# Patient Record
Sex: FEMALE | Race: WHITE | Employment: FULL TIME | URBAN - METROPOLITAN AREA
[De-identification: names, ages, dates, MRNs, and addresses within clinical notes are randomized per-mention and may not be internally consistent; named-entity substitution may affect disease eponyms.]

---

## 2017-01-26 RX ORDER — QUINAPRIL HCL AND HYDROCHLOROTHIAZIDE 10; 12.5 MG/1; MG/1
TABLET ORAL
Qty: 90 TABLET | Refills: 0 | OUTPATIENT
Start: 2017-01-26

## 2017-06-07 ENCOUNTER — OFFICE VISIT (OUTPATIENT)
Dept: INTERNAL MEDICINE CLINIC | Facility: CLINIC | Age: 62
End: 2017-06-07

## 2017-06-07 ENCOUNTER — TELEPHONE (OUTPATIENT)
Dept: INTERNAL MEDICINE CLINIC | Facility: CLINIC | Age: 62
End: 2017-06-07

## 2017-06-07 DIAGNOSIS — H81.10 BPPV (BENIGN PAROXYSMAL POSITIONAL VERTIGO), UNSPECIFIED LATERALITY: Primary | ICD-10-CM

## 2017-06-07 PROCEDURE — 99213 OFFICE O/P EST LOW 20 MIN: CPT | Performed by: PHYSICIAN ASSISTANT

## 2017-06-07 RX ORDER — PREDNISONE 20 MG/1
20 TABLET ORAL DAILY
Qty: 7 TABLET | Refills: 0 | Status: SHIPPED | OUTPATIENT
Start: 2017-06-07 | End: 2017-06-14

## 2017-06-07 RX ORDER — MECLIZINE HYDROCHLORIDE 25 MG/1
25 TABLET ORAL 3 TIMES DAILY PRN
Qty: 21 TABLET | Refills: 0 | Status: SHIPPED | OUTPATIENT
Start: 2017-06-07 | End: 2018-04-09 | Stop reason: ALTCHOICE

## 2017-06-07 NOTE — TELEPHONE ENCOUNTER
Spoke with patient and she c/o waking up with a lot of dizziness that gets worse when she stands and walks. She denies nausea, vomiting, ear pain, fevers, chest pain, SOB or HA.    Patient took a Sudafed this morning and she states her symptoms improved sli

## 2017-06-07 NOTE — TELEPHONE ENCOUNTER
Pt left a message saying she has been dizzy since the morning, and has to stay laying down - she has been drinking a lot of water but the dizziness is getting worse. Please call her to advise.

## 2017-06-07 NOTE — TELEPHONE ENCOUNTER
Patient currently takes antihypertensives, she was advised to take her BP and call back with reading.

## 2017-06-07 NOTE — TELEPHONE ENCOUNTER
Wandadon Estrada recommends coming in for evaluation of symptoms, due to all her symptoms being new and sudden. If she can not here, she needs to proceed to the ED. Spoke with patient and relayed instructions.  She verbalized understanding and states she will try

## 2017-06-09 VITALS
DIASTOLIC BLOOD PRESSURE: 80 MMHG | HEART RATE: 79 BPM | SYSTOLIC BLOOD PRESSURE: 130 MMHG | TEMPERATURE: 98 F | OXYGEN SATURATION: 98 % | RESPIRATION RATE: 16 BRPM

## 2017-06-09 NOTE — PROGRESS NOTES
HPI:    Patient ID: Jamaal Ortega is a 58year old female. Dizziness  This is a new problem. The current episode started today. The problem occurs constantly. The problem has been gradually improving. Associated symptoms include nausea and vertigo.  Pert aspirin 81 MG Oral Tab Take 81 mg by mouth daily. Disp:  Rfl:    [DISCONTINUED] Rosuvastatin Calcium (CRESTOR) 10 MG Oral Tab Take 1 tablet (10 mg total) by mouth daily.  Disp: 90 tablet Rfl: 1     Allergies:No Known Allergies   PHYSICAL EXAM:   Physical this encounter. Meds This Visit:  Signed Prescriptions Disp Refills    predniSONE 20 MG Oral Tab 7 tablet 0      Sig: Take 1 tablet (20 mg total) by mouth daily.       Meclizine HCl 25 MG Oral Tab 21 tablet 0      Sig: Take 1 tablet (25 mg total) by m

## 2017-06-18 DIAGNOSIS — E78.5 HYPERLIPIDEMIA WITH TARGET LDL LESS THAN 130: Primary | ICD-10-CM

## 2017-06-19 RX ORDER — ROSUVASTATIN CALCIUM 10 MG/1
TABLET, COATED ORAL
Qty: 90 TABLET | Refills: 1 | Status: SHIPPED | OUTPATIENT
Start: 2017-06-19 | End: 2018-04-09

## 2017-07-10 ENCOUNTER — CHARTING TRANS (OUTPATIENT)
Dept: OTHER | Age: 62
End: 2017-07-10

## 2017-07-12 ENCOUNTER — TELEPHONE (OUTPATIENT)
Dept: INTERNAL MEDICINE CLINIC | Facility: CLINIC | Age: 62
End: 2017-07-12

## 2017-07-12 DIAGNOSIS — J32.9 SINUSITIS, UNSPECIFIED CHRONICITY, UNSPECIFIED LOCATION: Primary | ICD-10-CM

## 2017-07-12 RX ORDER — AZITHROMYCIN 250 MG/1
TABLET, FILM COATED ORAL
Qty: 6 TABLET | Refills: 0 | Status: SHIPPED | OUTPATIENT
Start: 2017-07-12 | End: 2017-07-20 | Stop reason: ALTCHOICE

## 2017-07-12 NOTE — TELEPHONE ENCOUNTER
Spoke with patient and she c/o sore throat, hoarseness, nasal congestion for a couple of days. She denies fevers, nausea or vomiting. Patient requesting Levi Hernandez be sent to her pharmacy. States she does not have a way to get here today to be seen.          Paige Hussein

## 2017-07-12 NOTE — TELEPHONE ENCOUNTER
Pt has had a sore throat/ear ache and congestion since yesterday - she wants to know if we could call in a medication for her? Her daughter is due with a baby soon so she wants to get over this sickness as soon as she can. Please c/b at [phone].

## 2017-07-18 RX ORDER — QUINAPRIL HCL AND HYDROCHLOROTHIAZIDE 10; 12.5 MG/1; MG/1
TABLET ORAL
Qty: 90 TABLET | Refills: 0 | Status: SHIPPED | OUTPATIENT
Start: 2017-07-18 | End: 2017-10-15

## 2017-07-20 ENCOUNTER — TELEPHONE (OUTPATIENT)
Dept: INTERNAL MEDICINE CLINIC | Facility: CLINIC | Age: 62
End: 2017-07-20

## 2017-07-20 ENCOUNTER — OFFICE VISIT (OUTPATIENT)
Dept: INTERNAL MEDICINE CLINIC | Facility: CLINIC | Age: 62
End: 2017-07-20

## 2017-07-20 VITALS
WEIGHT: 178 LBS | DIASTOLIC BLOOD PRESSURE: 82 MMHG | HEART RATE: 64 BPM | OXYGEN SATURATION: 99 % | RESPIRATION RATE: 16 BRPM | TEMPERATURE: 99 F | BODY MASS INDEX: 28.61 KG/M2 | HEIGHT: 66 IN | SYSTOLIC BLOOD PRESSURE: 128 MMHG

## 2017-07-20 DIAGNOSIS — R05.8 UPPER AIRWAY COUGH SYNDROME: Primary | ICD-10-CM

## 2017-07-20 PROCEDURE — 99213 OFFICE O/P EST LOW 20 MIN: CPT | Performed by: STUDENT IN AN ORGANIZED HEALTH CARE EDUCATION/TRAINING PROGRAM

## 2017-07-20 RX ORDER — CODEINE PHOSPHATE AND GUAIFENESIN 10; 100 MG/5ML; MG/5ML
10 SOLUTION ORAL NIGHTLY
Qty: 180 ML | Refills: 0 | Status: SHIPPED | OUTPATIENT
Start: 2017-07-20 | End: 2018-04-09 | Stop reason: ALTCHOICE

## 2017-07-20 RX ORDER — FLUTICASONE PROPIONATE 50 MCG
1 SPRAY, SUSPENSION (ML) NASAL DAILY
Qty: 1 BOTTLE | Refills: 0 | Status: SHIPPED | OUTPATIENT
Start: 2017-07-20 | End: 2017-07-27

## 2017-07-20 NOTE — TELEPHONE ENCOUNTER
Patient c/o nasal congestion and cough, denies fevers, N/V. She states nasal discharge is clear. She is requesting to be seen as soon as possible before noon today. States she completed Zpak given by Adrian Martinez.  Denies taking any antihistamines or anything el

## 2017-07-20 NOTE — PROGRESS NOTES
HPI:    Patient ID: Carmela Bills is a 58year old female. Cough   This is a new problem. Episode onset: for the past 10 days. The problem has been unchanged. The problem occurs hourly. The cough is non-productive.  Associated symptoms include nasal jonn Tab Take 81 mg by mouth daily. Disp:  Rfl:      Allergies:No Known Allergies   PHYSICAL EXAM:   Physical Exam   Constitutional: She is oriented to person, place, and time. She appears well-developed and well-nourished.    HENT:   Head: Normocephalic and atr

## 2017-07-20 NOTE — TELEPHONE ENCOUNTER
Pt was prescribed an antibiotic for her cold symptoms, but she has been taking the medication for 8 days and is still congested/doesn't feel well. She is concerned bc her daughter is due with a baby any day and she wants to be healthy.  Please let her know

## 2017-10-15 DIAGNOSIS — I10 HTN, GOAL BELOW 140/90: Primary | ICD-10-CM

## 2017-10-16 RX ORDER — QUINAPRIL HCL AND HYDROCHLOROTHIAZIDE 10; 12.5 MG/1; MG/1
TABLET ORAL
Qty: 90 TABLET | Refills: 0 | Status: SHIPPED | OUTPATIENT
Start: 2017-10-16 | End: 2018-01-14

## 2017-10-16 NOTE — TELEPHONE ENCOUNTER
Rx refill sent to retail electronically per protocol. The pt was reminded an appointment for a physical is due next month.  (This is not yet scheduled.)

## 2017-12-13 DIAGNOSIS — E78.5 HYPERLIPIDEMIA WITH TARGET LDL LESS THAN 130: ICD-10-CM

## 2017-12-13 RX ORDER — ROSUVASTATIN CALCIUM 10 MG/1
TABLET, COATED ORAL
Qty: 90 TABLET | Refills: 0 | Status: SHIPPED | OUTPATIENT
Start: 2017-12-13 | End: 2018-04-06

## 2018-01-14 DIAGNOSIS — I10 HTN, GOAL BELOW 140/90: ICD-10-CM

## 2018-01-15 RX ORDER — QUINAPRIL HCL AND HYDROCHLOROTHIAZIDE 10; 12.5 MG/1; MG/1
TABLET ORAL
Qty: 90 TABLET | Refills: 0 | Status: SHIPPED | OUTPATIENT
Start: 2018-01-15 | End: 2018-04-09

## 2018-02-05 ENCOUNTER — TELEPHONE (OUTPATIENT)
Dept: INTERNAL MEDICINE CLINIC | Facility: CLINIC | Age: 63
End: 2018-02-05

## 2018-04-02 ENCOUNTER — TELEPHONE (OUTPATIENT)
Dept: INTERNAL MEDICINE CLINIC | Facility: CLINIC | Age: 63
End: 2018-04-02

## 2018-04-02 DIAGNOSIS — Z13.1 SCREENING FOR DIABETES MELLITUS: ICD-10-CM

## 2018-04-02 DIAGNOSIS — Z13.89 SCREENING FOR BLOOD OR PROTEIN IN URINE: ICD-10-CM

## 2018-04-02 DIAGNOSIS — Z13.0 SCREENING FOR ENDOCRINE, METABOLIC AND IMMUNITY DISORDER: ICD-10-CM

## 2018-04-02 DIAGNOSIS — Z13.29 SCREENING FOR THYROID DISORDER: ICD-10-CM

## 2018-04-02 DIAGNOSIS — Z01.84 IMMUNITY STATUS TESTING: Primary | ICD-10-CM

## 2018-04-02 DIAGNOSIS — Z13.228 SCREENING FOR ENDOCRINE, METABOLIC AND IMMUNITY DISORDER: ICD-10-CM

## 2018-04-02 DIAGNOSIS — Z13.220 LIPID SCREENING: ICD-10-CM

## 2018-04-02 DIAGNOSIS — Z13.0 SCREENING FOR IRON DEFICIENCY ANEMIA: ICD-10-CM

## 2018-04-02 DIAGNOSIS — Z13.29 SCREENING FOR ENDOCRINE, METABOLIC AND IMMUNITY DISORDER: ICD-10-CM

## 2018-04-02 NOTE — TELEPHONE ENCOUNTER
Pt wants to know what lab orders she needs to complete before she can get a med refill? She also wants us to order a Hep C test. Please c/b to let her know.

## 2018-04-02 NOTE — TELEPHONE ENCOUNTER
CBC, CMP, Lipid, UA, HCV, HgbA1c and TSH with reflex orders pending for [facility]. Discussed the pt is not just due for blood work but also a physical.     Informed the pt the last physical in the office was on 11/15/2016.  The pt was also seen in the [facility]

## 2018-04-03 ENCOUNTER — LAB ENCOUNTER (OUTPATIENT)
Dept: LAB | Age: 63
End: 2018-04-03
Attending: INTERNAL MEDICINE
Payer: COMMERCIAL

## 2018-04-03 DIAGNOSIS — Z13.0 SCREENING FOR IRON DEFICIENCY ANEMIA: ICD-10-CM

## 2018-04-03 DIAGNOSIS — Z13.29 SCREENING FOR ENDOCRINE, METABOLIC AND IMMUNITY DISORDER: ICD-10-CM

## 2018-04-03 DIAGNOSIS — Z13.220 LIPID SCREENING: ICD-10-CM

## 2018-04-03 DIAGNOSIS — Z13.89 SCREENING FOR BLOOD OR PROTEIN IN URINE: ICD-10-CM

## 2018-04-03 DIAGNOSIS — Z13.228 SCREENING FOR ENDOCRINE, METABOLIC AND IMMUNITY DISORDER: ICD-10-CM

## 2018-04-03 DIAGNOSIS — Z13.0 SCREENING FOR ENDOCRINE, METABOLIC AND IMMUNITY DISORDER: ICD-10-CM

## 2018-04-03 DIAGNOSIS — Z13.29 SCREENING FOR THYROID DISORDER: ICD-10-CM

## 2018-04-03 DIAGNOSIS — Z13.1 SCREENING FOR DIABETES MELLITUS: ICD-10-CM

## 2018-04-03 DIAGNOSIS — Z01.84 IMMUNITY STATUS TESTING: ICD-10-CM

## 2018-04-03 PROCEDURE — 81001 URINALYSIS AUTO W/SCOPE: CPT | Performed by: INTERNAL MEDICINE

## 2018-04-03 PROCEDURE — 86803 HEPATITIS C AB TEST: CPT | Performed by: INTERNAL MEDICINE

## 2018-04-03 PROCEDURE — 80061 LIPID PANEL: CPT | Performed by: INTERNAL MEDICINE

## 2018-04-03 PROCEDURE — 36415 COLL VENOUS BLD VENIPUNCTURE: CPT | Performed by: INTERNAL MEDICINE

## 2018-04-03 PROCEDURE — 83036 HEMOGLOBIN GLYCOSYLATED A1C: CPT | Performed by: INTERNAL MEDICINE

## 2018-04-03 PROCEDURE — 80050 GENERAL HEALTH PANEL: CPT | Performed by: INTERNAL MEDICINE

## 2018-04-06 DIAGNOSIS — E78.5 HYPERLIPIDEMIA WITH TARGET LDL LESS THAN 130: ICD-10-CM

## 2018-04-06 RX ORDER — ROSUVASTATIN CALCIUM 10 MG/1
TABLET, COATED ORAL
Qty: 90 TABLET | Refills: 0 | Status: SHIPPED | OUTPATIENT
Start: 2018-04-06 | End: 2018-04-09

## 2018-04-09 ENCOUNTER — OFFICE VISIT (OUTPATIENT)
Dept: INTERNAL MEDICINE CLINIC | Facility: CLINIC | Age: 63
End: 2018-04-09

## 2018-04-09 VITALS
DIASTOLIC BLOOD PRESSURE: 88 MMHG | WEIGHT: 186 LBS | TEMPERATURE: 98 F | OXYGEN SATURATION: 98 % | SYSTOLIC BLOOD PRESSURE: 138 MMHG | HEART RATE: 68 BPM | RESPIRATION RATE: 16 BRPM | BODY MASS INDEX: 29.89 KG/M2 | HEIGHT: 66 IN

## 2018-04-09 DIAGNOSIS — Z00.00 PHYSICAL EXAM, ANNUAL: Primary | ICD-10-CM

## 2018-04-09 DIAGNOSIS — I10 HTN, GOAL BELOW 140/90: ICD-10-CM

## 2018-04-09 DIAGNOSIS — E78.5 HYPERLIPIDEMIA WITH TARGET LDL LESS THAN 130: ICD-10-CM

## 2018-04-09 PROCEDURE — 99396 PREV VISIT EST AGE 40-64: CPT | Performed by: INTERNAL MEDICINE

## 2018-04-09 RX ORDER — QUINAPRIL HCL AND HYDROCHLOROTHIAZIDE 10; 12.5 MG/1; MG/1
1 TABLET ORAL
Qty: 90 TABLET | Refills: 3 | Status: SHIPPED | OUTPATIENT
Start: 2018-04-09 | End: 2019-04-04

## 2018-04-09 RX ORDER — ROSUVASTATIN CALCIUM 10 MG/1
TABLET, COATED ORAL
Qty: 90 TABLET | Refills: 3 | Status: SHIPPED | OUTPATIENT
Start: 2018-04-09 | End: 2018-07-05

## 2018-04-09 NOTE — PROGRESS NOTES
HPI:    Patient ID: J Carlos Parra is a 58year old female. HPI  HPI:   J Carlos Parra is a 58year old female who presents for a complete physical exam. Symptoms: denies discharge, itching, burning or dysuria, is menopausal. Patient complains of nothing. Heart Disorder Father       Social History:   Smoking status: Never Smoker                                                              Smokeless tobacco: Never Used                      Alcohol use:  Yes              Comment: rare; 1 glass monthly    Occ: reviewed at goal fasting Lipids, CMP, and CBC. Pt reports she is up to date with screening colonoscopy. Pt info handouts given for: exercise, low fat diet and breast self-exam.Body mass index is 30.02 kg/m². , recommended low fat diet and aerobic exercise 3

## 2018-04-09 NOTE — PATIENT INSTRUCTIONS
Understanding Dietary Fat     Olive oil is a common source of unsaturated fat. There are different kinds of fats in the foods you eat. Fats can be saturated or unsaturated.  Planning meals that are low in saturated fat helps reduce the level of choles · Monounsaturated fats are found in avocados, olive oil, canola oil, and peanut oil. Some margarines and spreads are now made with these oils, too. Of all fats, monounsaturated fats are the least harmful to your heart.   Date Last Reviewed: 6/1/2017  © 2000

## 2018-04-10 ENCOUNTER — IMAGING SERVICES (OUTPATIENT)
Dept: OTHER | Age: 63
End: 2018-04-10

## 2018-04-10 ENCOUNTER — HOSPITAL (OUTPATIENT)
Dept: OTHER | Age: 63
End: 2018-04-10
Attending: LEGAL MEDICINE

## 2018-07-03 DIAGNOSIS — E78.5 HYPERLIPIDEMIA WITH TARGET LDL LESS THAN 130: ICD-10-CM

## 2018-07-05 RX ORDER — ROSUVASTATIN CALCIUM 10 MG/1
TABLET, COATED ORAL
Qty: 90 TABLET | Refills: 1 | Status: SHIPPED | OUTPATIENT
Start: 2018-07-05 | End: 2019-04-04

## 2018-07-16 ENCOUNTER — OFFICE VISIT (OUTPATIENT)
Dept: INTERNAL MEDICINE CLINIC | Facility: CLINIC | Age: 63
End: 2018-07-16

## 2018-07-16 VITALS
SYSTOLIC BLOOD PRESSURE: 126 MMHG | HEART RATE: 77 BPM | BODY MASS INDEX: 29.09 KG/M2 | WEIGHT: 181 LBS | TEMPERATURE: 99 F | DIASTOLIC BLOOD PRESSURE: 80 MMHG | RESPIRATION RATE: 16 BRPM | HEIGHT: 66 IN | OXYGEN SATURATION: 97 %

## 2018-07-16 DIAGNOSIS — J02.9 ACUTE PHARYNGITIS, UNSPECIFIED ETIOLOGY: ICD-10-CM

## 2018-07-16 DIAGNOSIS — H65.03 BILATERAL ACUTE SEROUS OTITIS MEDIA, RECURRENCE NOT SPECIFIED: Primary | ICD-10-CM

## 2018-07-16 LAB
CONTROL LINE PRESENT WITH A CLEAR BACKGROUND (YES/NO): YES YES/NO
STREP GRP A CUL-SCR: NEGATIVE

## 2018-07-16 PROCEDURE — 87880 STREP A ASSAY W/OPTIC: CPT | Performed by: STUDENT IN AN ORGANIZED HEALTH CARE EDUCATION/TRAINING PROGRAM

## 2018-07-16 PROCEDURE — 99213 OFFICE O/P EST LOW 20 MIN: CPT | Performed by: STUDENT IN AN ORGANIZED HEALTH CARE EDUCATION/TRAINING PROGRAM

## 2018-07-16 RX ORDER — AMOXICILLIN AND CLAVULANATE POTASSIUM 875; 125 MG/1; MG/1
1 TABLET, FILM COATED ORAL 2 TIMES DAILY
Qty: 20 TABLET | Refills: 0 | Status: SHIPPED | OUTPATIENT
Start: 2018-07-16 | End: 2018-07-26

## 2018-07-16 RX ORDER — PREDNISONE 20 MG/1
20 TABLET ORAL 2 TIMES DAILY
Qty: 14 TABLET | Refills: 0 | Status: SHIPPED | OUTPATIENT
Start: 2018-07-16 | End: 2018-07-23

## 2018-07-16 NOTE — PROGRESS NOTES
HPI:    Patient ID: Bucky Celaya is a 61year old female. Sore Throat    This is a new problem. The current episode started in the past 7 days. The problem has been gradually worsening. There has been no fever. The pain is at a severity of 6/10.  The pa is oriented to person, place, and time. She appears well-developed and well-nourished. HENT:   Head: Normocephalic and atraumatic. Right Ear: Hearing normal. There is tenderness. No mastoid tenderness. Tympanic membrane is erythematous.    Left Ear: Hea asked to return if symptoms persist or worsen. No orders of the defined types were placed in this encounter.       Meds This Visit:  Signed Prescriptions Disp Refills    Amoxicillin-Pot Clavulanate 875-125 MG Oral Tab 20 tablet 0      Sig: Take 1 tablet

## 2018-07-17 ENCOUNTER — TELEPHONE (OUTPATIENT)
Dept: INTERNAL MEDICINE CLINIC | Facility: CLINIC | Age: 63
End: 2018-07-17

## 2018-07-17 NOTE — TELEPHONE ENCOUNTER
Patient called and wanted to know if we sent strep for culture. Advised Dr. Marcio San did not send for culture as she is being treated with Augmentin and Prednisone. Patient verbalized understanding.  She was advised to complete full course of treatment an

## 2018-08-27 ENCOUNTER — OFFICE VISIT (OUTPATIENT)
Dept: INTERNAL MEDICINE CLINIC | Facility: CLINIC | Age: 63
End: 2018-08-27
Payer: COMMERCIAL

## 2018-08-27 VITALS
SYSTOLIC BLOOD PRESSURE: 130 MMHG | WEIGHT: 178 LBS | TEMPERATURE: 98 F | RESPIRATION RATE: 16 BRPM | DIASTOLIC BLOOD PRESSURE: 86 MMHG | BODY MASS INDEX: 28.61 KG/M2 | HEART RATE: 76 BPM | HEIGHT: 66 IN

## 2018-08-27 DIAGNOSIS — R19.7 DIARRHEA OF PRESUMED INFECTIOUS ORIGIN: Primary | ICD-10-CM

## 2018-08-27 PROCEDURE — 99213 OFFICE O/P EST LOW 20 MIN: CPT | Performed by: PHYSICIAN ASSISTANT

## 2018-08-27 NOTE — PATIENT INSTRUCTIONS
Increase fluid intake with electrolyte-containing beverage such as coconut water. Avoid caffeine and dairy. Milroy diet for next week and then advance as tolerated.   Start a probiotic for 2 weeks: recommended strength is around 10 billion or more CFU's, suc Avoid: Chili powder, cloves, pepper, seed spices, garlic, gravy pickles, highly seasoned salad dressings  Date Last Reviewed: 11/20/2015  © 4792-8308 [employer]. [address] [facility] [place], [address] [place] [address]. All rights reserved.  This informatio

## 2018-08-27 NOTE — PROGRESS NOTES
HPI:    Patient ID: Bowen Granados is a 61year old female. Patient presents with:  Diarrhea: Rm 7     Diarrhea    This is a new problem. The current episode started in the past 7 days. The problem occurs 5 to 10 times per day.  The problem has been [place] TABLET BY MOUTH DAILY Disp: 90 tablet Rfl: 1   Quinapril-Hydrochlorothiazide 10-12.5 MG Oral Tab Take 1 tablet by mouth once daily. Disp: 90 tablet Rfl: 3   aspirin 81 MG Oral Tab Take 81 mg by mouth daily.  Disp:  Rfl:      Allergies:No Known Allergies   P ordered in this encounter    Imaging & Referrals:  None         ID#[id number]

## 2018-08-28 ENCOUNTER — TELEPHONE (OUTPATIENT)
Dept: INTERNAL MEDICINE CLINIC | Facility: CLINIC | Age: 63
End: 2018-08-28

## 2018-08-28 ENCOUNTER — LAB ENCOUNTER (OUTPATIENT)
Dept: LAB | Age: 63
End: 2018-08-28
Attending: PHYSICIAN ASSISTANT
Payer: COMMERCIAL

## 2018-08-28 DIAGNOSIS — R19.7 DIARRHEA, UNSPECIFIED TYPE: Primary | ICD-10-CM

## 2018-08-28 DIAGNOSIS — R19.7 DIARRHEA, UNSPECIFIED TYPE: ICD-10-CM

## 2018-08-28 DIAGNOSIS — R10.9 ABDOMINAL CRAMPING: Primary | ICD-10-CM

## 2018-08-28 PROCEDURE — 87209 SMEAR COMPLEX STAIN: CPT | Performed by: PHYSICIAN ASSISTANT

## 2018-08-28 PROCEDURE — 87272 CRYPTOSPORIDIUM AG IF: CPT | Performed by: PHYSICIAN ASSISTANT

## 2018-08-28 PROCEDURE — 87046 STOOL CULTR AEROBIC BACT EA: CPT | Performed by: PHYSICIAN ASSISTANT

## 2018-08-28 PROCEDURE — 87329 GIARDIA AG IA: CPT | Performed by: PHYSICIAN ASSISTANT

## 2018-08-28 PROCEDURE — 87427 SHIGA-LIKE TOXIN AG IA: CPT | Performed by: PHYSICIAN ASSISTANT

## 2018-08-28 PROCEDURE — 87045 FECES CULTURE AEROBIC BACT: CPT | Performed by: PHYSICIAN ASSISTANT

## 2018-08-28 PROCEDURE — 87493 C DIFF AMPLIFIED PROBE: CPT | Performed by: PHYSICIAN ASSISTANT

## 2018-08-28 PROCEDURE — 87177 OVA AND PARASITES SMEARS: CPT | Performed by: PHYSICIAN ASSISTANT

## 2018-08-28 RX ORDER — DICYCLOMINE HYDROCHLORIDE 10 MG/1
10 CAPSULE ORAL
Qty: 21 CAPSULE | Refills: 0 | Status: SHIPPED | OUTPATIENT
Start: 2018-08-28 | End: 2018-09-07

## 2018-08-28 NOTE — TELEPHONE ENCOUNTER
The pt is c/o cramping throughout the abdomen that is at times relieved by a bowel movement. The pt has rated the pain between 4-6/10. Per Radha De La Garza, Rx sent to retail for Bentyl 10mg TID PRN. Did discuss the pt's pending testing.  The office will conta

## 2018-08-28 NOTE — TELEPHONE ENCOUNTER
Patient called and stated she dropped her fecal samples to the lab, and they informed it will take 4-5 days. However, she requested a call back to know what to do about her pain. Please advise.

## 2018-08-28 NOTE — TELEPHONE ENCOUNTER
Arash's recommendations from yesterday's appointment: If symptoms persist > 2 more days will check stool studies including culture, c.diff and O/P. The pt states feels worse today.  The pt states has been up  All last night and went to the bathroom 6-7 t

## 2018-08-28 NOTE — TELEPHONE ENCOUNTER
VOICEMAIL: pt was seen yesterday by Shannon Rider and was told to call back if not feeling any better, pt states she still does not feel any difference since yesterday and would like to know what she should do

## 2018-08-29 ENCOUNTER — TELEPHONE (OUTPATIENT)
Dept: INTERNAL MEDICINE CLINIC | Facility: CLINIC | Age: 63
End: 2018-08-29

## 2018-08-29 DIAGNOSIS — A04.72 C. DIFFICILE DIARRHEA: Primary | ICD-10-CM

## 2018-08-29 LAB
CRYPTOSP AG STL QL IA: NEGATIVE
G LAMBLIA AG STL QL IA: NEGATIVE

## 2018-08-29 RX ORDER — VANCOMYCIN HYDROCHLORIDE 125 MG/1
125 CAPSULE ORAL 4 TIMES DAILY
Qty: 40 CAPSULE | Refills: 0 | Status: SHIPPED | OUTPATIENT
Start: 2018-08-29 | End: 2018-09-08

## 2018-08-29 NOTE — TELEPHONE ENCOUNTER
----- Message from Marlys Bacon PA-C sent at 8/29/2018 12:37 PM CDT -----  Please inform pt: + c.diff.  Recommend vancomycin 125mg PO QID  X 10 days

## 2018-08-31 LAB
OVA AND PARASITE, TRICHROME STAIN: NEGATIVE
OVA AND PARASITE, WET MOUNT: NEGATIVE

## 2018-11-03 VITALS
WEIGHT: 184.99 LBS | BODY MASS INDEX: 30.82 KG/M2 | HEIGHT: 65 IN | HEART RATE: 70 BPM | SYSTOLIC BLOOD PRESSURE: 128 MMHG | DIASTOLIC BLOOD PRESSURE: 80 MMHG

## 2019-04-04 DIAGNOSIS — E78.5 HYPERLIPIDEMIA WITH TARGET LDL LESS THAN 130: ICD-10-CM

## 2019-04-04 DIAGNOSIS — I10 HTN, GOAL BELOW 140/90: ICD-10-CM

## 2019-04-04 RX ORDER — ROSUVASTATIN CALCIUM 10 MG/1
TABLET, COATED ORAL
Qty: 30 TABLET | Refills: 0 | Status: SHIPPED | OUTPATIENT
Start: 2019-04-04 | End: 2019-04-29

## 2019-04-04 RX ORDER — QUINAPRIL HCL AND HYDROCHLOROTHIAZIDE 10; 12.5 MG/1; MG/1
1 TABLET ORAL DAILY
Qty: 30 TABLET | Refills: 0 | Status: SHIPPED | OUTPATIENT
Start: 2019-04-04 | End: 2019-04-29

## 2019-04-04 NOTE — TELEPHONE ENCOUNTER
1 month supply sent. The pt was reminded an appointment is due. LOV 8/27/2018. Last physical: 4/9/2018.

## 2019-04-09 ENCOUNTER — TELEPHONE (OUTPATIENT)
Dept: INTERNAL MEDICINE CLINIC | Facility: CLINIC | Age: 64
End: 2019-04-09

## 2019-04-09 NOTE — TELEPHONE ENCOUNTER
Pt states she watches her grandkids 3x weekly and the have Influenza B and she is wondering if their is anything she should do so she dont catch it, call back

## 2019-04-09 NOTE — TELEPHONE ENCOUNTER
Patient denies any symptoms. States her grand kids were Dx with Influenza B, but are not having any fever, N/V/D anymore just cold symptoms. Patient advised we could send Tamiflu prophylactic dose.  Reviewed medications side effects and dosing and patie

## 2019-04-29 DIAGNOSIS — E78.5 HYPERLIPIDEMIA WITH TARGET LDL LESS THAN 130: ICD-10-CM

## 2019-04-29 DIAGNOSIS — I10 HTN, GOAL BELOW 140/90: ICD-10-CM

## 2019-04-29 RX ORDER — QUINAPRIL HCL AND HYDROCHLOROTHIAZIDE 10; 12.5 MG/1; MG/1
1 TABLET ORAL DAILY
Qty: 30 TABLET | Refills: 0 | Status: SHIPPED | OUTPATIENT
Start: 2019-04-29 | End: 2019-05-25

## 2019-04-29 RX ORDER — ROSUVASTATIN CALCIUM 10 MG/1
10 TABLET, COATED ORAL NIGHTLY
Qty: 30 TABLET | Refills: 0 | Status: SHIPPED | OUTPATIENT
Start: 2019-04-29 | End: 2019-05-26

## 2019-05-25 DIAGNOSIS — I10 HTN, GOAL BELOW 140/90: ICD-10-CM

## 2019-05-26 DIAGNOSIS — E78.5 HYPERLIPIDEMIA WITH TARGET LDL LESS THAN 130: ICD-10-CM

## 2019-05-28 RX ORDER — QUINAPRIL HCL AND HYDROCHLOROTHIAZIDE 10; 12.5 MG/1; MG/1
TABLET ORAL
Qty: 30 TABLET | Refills: 3 | Status: SHIPPED | OUTPATIENT
Start: 2019-05-28 | End: 2019-12-20

## 2019-05-28 RX ORDER — ROSUVASTATIN CALCIUM 10 MG/1
TABLET, COATED ORAL
Qty: 30 TABLET | Refills: 3 | Status: SHIPPED | OUTPATIENT
Start: 2019-05-28 | End: 2019-09-20

## 2019-06-11 ENCOUNTER — TELEPHONE (OUTPATIENT)
Dept: OBGYN | Age: 64
End: 2019-06-11

## 2019-06-11 DIAGNOSIS — Z12.39 SCREENING FOR MALIGNANT NEOPLASM OF BREAST: Primary | ICD-10-CM

## 2019-06-12 ENCOUNTER — HOSPITAL (OUTPATIENT)
Dept: OTHER | Age: 64
End: 2019-06-12
Attending: LEGAL MEDICINE

## 2019-06-13 ENCOUNTER — TELEPHONE (OUTPATIENT)
Dept: SCHEDULING | Age: 64
End: 2019-06-13

## 2019-09-20 ENCOUNTER — MED REC SCAN ONLY (OUTPATIENT)
Dept: INTERNAL MEDICINE CLINIC | Facility: CLINIC | Age: 64
End: 2019-09-20

## 2019-09-20 DIAGNOSIS — I10 HTN, GOAL BELOW 140/90: ICD-10-CM

## 2019-09-20 DIAGNOSIS — E78.5 HYPERLIPIDEMIA WITH TARGET LDL LESS THAN 130: ICD-10-CM

## 2019-09-20 RX ORDER — ROSUVASTATIN CALCIUM 10 MG/1
TABLET, COATED ORAL
Qty: 30 TABLET | Refills: 0 | Status: SHIPPED | OUTPATIENT
Start: 2019-09-20 | End: 2019-10-20

## 2019-09-20 RX ORDER — QUINAPRIL HCL AND HYDROCHLOROTHIAZIDE 10; 12.5 MG/1; MG/1
TABLET ORAL
Qty: 30 TABLET | Refills: 0 | Status: SHIPPED | OUTPATIENT
Start: 2019-09-20 | End: 2019-10-20

## 2019-10-20 DIAGNOSIS — E78.5 HYPERLIPIDEMIA WITH TARGET LDL LESS THAN 130: ICD-10-CM

## 2019-10-20 DIAGNOSIS — I10 HTN, GOAL BELOW 140/90: ICD-10-CM

## 2019-10-21 RX ORDER — ROSUVASTATIN CALCIUM 10 MG/1
TABLET, COATED ORAL
Qty: 30 TABLET | Refills: 0 | Status: SHIPPED | OUTPATIENT
Start: 2019-10-21 | End: 2019-11-18

## 2019-10-21 RX ORDER — QUINAPRIL HCL AND HYDROCHLOROTHIAZIDE 10; 12.5 MG/1; MG/1
TABLET ORAL
Qty: 30 TABLET | Refills: 0 | Status: SHIPPED | OUTPATIENT
Start: 2019-10-21 | End: 2019-11-18

## 2019-11-18 DIAGNOSIS — I10 HTN, GOAL BELOW 140/90: ICD-10-CM

## 2019-11-18 DIAGNOSIS — E78.5 HYPERLIPIDEMIA WITH TARGET LDL LESS THAN 130: ICD-10-CM

## 2019-11-18 RX ORDER — ROSUVASTATIN CALCIUM 10 MG/1
TABLET, COATED ORAL
Qty: 30 TABLET | Refills: 0 | Status: SHIPPED | OUTPATIENT
Start: 2019-11-18 | End: 2019-12-20

## 2019-11-18 RX ORDER — QUINAPRIL HCL AND HYDROCHLOROTHIAZIDE 10; 12.5 MG/1; MG/1
TABLET ORAL
Qty: 30 TABLET | Refills: 0 | Status: SHIPPED | OUTPATIENT
Start: 2019-11-18 | End: 2019-12-20

## 2019-11-18 NOTE — TELEPHONE ENCOUNTER
hydrochlorothyazide. Last time medication was refilled 10/21/2019   Quantity  and number of refills 30 with    Last office visit 08/2018   Next office visit past due for visit, pt notified to schedule.       Rosuvastatin  Last time medication was refille

## 2019-12-06 ENCOUNTER — TELEPHONE (OUTPATIENT)
Dept: INTERNAL MEDICINE CLINIC | Facility: CLINIC | Age: 64
End: 2019-12-06

## 2019-12-06 DIAGNOSIS — Z00.00 PHYSICAL EXAM, ANNUAL: Primary | ICD-10-CM

## 2019-12-14 ENCOUNTER — LAB ENCOUNTER (OUTPATIENT)
Dept: LAB | Age: 64
End: 2019-12-14
Attending: INTERNAL MEDICINE
Payer: COMMERCIAL

## 2019-12-14 DIAGNOSIS — Z00.00 PHYSICAL EXAM, ANNUAL: ICD-10-CM

## 2019-12-14 PROCEDURE — 80050 GENERAL HEALTH PANEL: CPT | Performed by: INTERNAL MEDICINE

## 2019-12-14 PROCEDURE — 82306 VITAMIN D 25 HYDROXY: CPT | Performed by: INTERNAL MEDICINE

## 2019-12-14 PROCEDURE — 80061 LIPID PANEL: CPT | Performed by: INTERNAL MEDICINE

## 2019-12-14 PROCEDURE — 36415 COLL VENOUS BLD VENIPUNCTURE: CPT | Performed by: INTERNAL MEDICINE

## 2019-12-14 PROCEDURE — 83036 HEMOGLOBIN GLYCOSYLATED A1C: CPT | Performed by: INTERNAL MEDICINE

## 2019-12-14 PROCEDURE — 81001 URINALYSIS AUTO W/SCOPE: CPT | Performed by: INTERNAL MEDICINE

## 2019-12-20 ENCOUNTER — OFFICE VISIT (OUTPATIENT)
Dept: INTERNAL MEDICINE CLINIC | Facility: CLINIC | Age: 64
End: 2019-12-20
Payer: COMMERCIAL

## 2019-12-20 ENCOUNTER — LAB ENCOUNTER (OUTPATIENT)
Dept: LAB | Age: 64
End: 2019-12-20
Attending: PHYSICIAN ASSISTANT
Payer: COMMERCIAL

## 2019-12-20 VITALS
TEMPERATURE: 98 F | DIASTOLIC BLOOD PRESSURE: 86 MMHG | HEIGHT: 66 IN | SYSTOLIC BLOOD PRESSURE: 132 MMHG | HEART RATE: 80 BPM | WEIGHT: 186 LBS | RESPIRATION RATE: 18 BRPM | BODY MASS INDEX: 29.89 KG/M2

## 2019-12-20 DIAGNOSIS — E78.5 HYPERLIPIDEMIA WITH TARGET LDL LESS THAN 130: ICD-10-CM

## 2019-12-20 DIAGNOSIS — I10 HTN, GOAL BELOW 140/90: ICD-10-CM

## 2019-12-20 DIAGNOSIS — M25.541 ARTHRALGIA OF BOTH HANDS: ICD-10-CM

## 2019-12-20 DIAGNOSIS — E55.9 VITAMIN D DEFICIENCY: Primary | ICD-10-CM

## 2019-12-20 DIAGNOSIS — M25.542 ARTHRALGIA OF BOTH HANDS: ICD-10-CM

## 2019-12-20 PROCEDURE — 36415 COLL VENOUS BLD VENIPUNCTURE: CPT | Performed by: PHYSICIAN ASSISTANT

## 2019-12-20 PROCEDURE — 99396 PREV VISIT EST AGE 40-64: CPT | Performed by: PHYSICIAN ASSISTANT

## 2019-12-20 PROCEDURE — 86140 C-REACTIVE PROTEIN: CPT | Performed by: PHYSICIAN ASSISTANT

## 2019-12-20 PROCEDURE — 85652 RBC SED RATE AUTOMATED: CPT | Performed by: PHYSICIAN ASSISTANT

## 2019-12-20 RX ORDER — ERGOCALCIFEROL 1.25 MG/1
50000 CAPSULE ORAL WEEKLY
Qty: 12 CAPSULE | Refills: 0 | Status: SHIPPED | OUTPATIENT
Start: 2019-12-20

## 2019-12-20 RX ORDER — ROSUVASTATIN CALCIUM 10 MG/1
TABLET, COATED ORAL
Qty: 90 TABLET | Refills: 1 | Status: SHIPPED | OUTPATIENT
Start: 2019-12-20 | End: 2020-05-28

## 2019-12-20 RX ORDER — QUINAPRIL HCL AND HYDROCHLOROTHIAZIDE 10; 12.5 MG/1; MG/1
1 TABLET ORAL
Qty: 90 TABLET | Refills: 1 | Status: SHIPPED | OUTPATIENT
Start: 2019-12-20 | End: 2020-05-28

## 2019-12-20 NOTE — PATIENT INSTRUCTIONS
Take vitamin D once weekly for 3 months  Continue healthy lifestyle and low-carb diet for weight loss and diabetes prevention

## 2019-12-20 NOTE — PROGRESS NOTES
Wellness Exam    CC: Patient is presenting for a wellness exam    HPI:   Concerns: increased stress, believes this is raising her blood sugar  Has been watching what she's eating, exercising more   Has concerns about joint pain, had RA ruled out years ago Take by mouth., Disp: , Rfl:   aspirin 81 MG Oral Tab, Take 81 mg by mouth daily. , Disp: , Rfl:   [DISCONTINUED] QUINAPRIL-HYDROCHLOROTHIAZIDE 10-12.5 MG Oral Tab, TAKE 1 TABLET BY MOUTH EVERY DAY, Disp: 30 tablet, Rfl: 3    No current facility-administere wheezes or rales. Breasts: declined sees gyn  Abdominal: Soft. Bowel sounds are normal. There is no tenderness. No HSM. Musculoskeletal: Normal range of motion. She exhibits no edema.    Lymphadenopathy: She has no cervical, supraclavicular, or axillary

## 2019-12-23 ENCOUNTER — HOSPITAL ENCOUNTER (OUTPATIENT)
Dept: GENERAL RADIOLOGY | Age: 64
Discharge: HOME | End: 2019-12-23
Attending: PHYSICIAN ASSISTANT
Payer: COMMERCIAL

## 2019-12-23 DIAGNOSIS — M79.672 LEFT FOOT PAIN: ICD-10-CM

## 2019-12-23 PROCEDURE — 73630 X-RAY EXAM OF FOOT: CPT | Performed by: PHYSICIAN ASSISTANT

## 2019-12-26 ENCOUNTER — TELEPHONE (OUTPATIENT)
Dept: INTERNAL MEDICINE CLINIC | Facility: CLINIC | Age: 64
End: 2019-12-26

## 2019-12-26 DIAGNOSIS — M79.672 LEFT FOOT PAIN: Primary | ICD-10-CM

## 2019-12-26 NOTE — TELEPHONE ENCOUNTER
Discussed with patient results and recommends. Prefers not to see podiatry and wants Ortho referral. Patient given Dr. Juan Kwon information. Francoise Lesch Dr. Vallorie Babe  [facility]   [place], [address] [place] [address] Rd  [phone]    Referral placed.

## 2019-12-26 NOTE — TELEPHONE ENCOUNTER
----- Message from Sergio Box PA-C sent at 12/26/2019  9:20 AM CST -----  Please inform pt:  Some arthritis, greatest at base of great toe. Sclerosis in middle of foot could be melorheostosis, but unlikely given pt's age and presentation.  See podia

## 2020-01-17 ENCOUNTER — TELEPHONE (OUTPATIENT)
Dept: INTERNAL MEDICINE CLINIC | Facility: CLINIC | Age: 65
End: 2020-01-17

## 2020-01-17 DIAGNOSIS — H10.9 CONJUNCTIVITIS OF LEFT EYE, UNSPECIFIED CONJUNCTIVITIS TYPE: Primary | ICD-10-CM

## 2020-01-17 RX ORDER — TOBRAMYCIN 3 MG/ML
2 SOLUTION/ DROPS OPHTHALMIC EVERY 4 HOURS
Qty: 5 ML | Refills: 0 | Status: SHIPPED | OUTPATIENT
Start: 2020-01-17

## 2020-01-17 NOTE — TELEPHONE ENCOUNTER
Patient states, \"Both grand kids that I was babysitting have pink eye and I'm starting to get it too. \"  Left eye  Itchy  Starting to get pink    Per Dr. Earlene Camilo:   Treat with Tobramycin: 4 x a day for 5 days.       Discussed with patient recommendation, bailey

## 2020-01-17 NOTE — TELEPHONE ENCOUNTER
Patient says she was babysitting her grand kids who both had pink eye, and now she is getting pink eye symptoms also - wants to know if we can prescribe eye drops for her? Please c/b to discuss.

## 2020-01-29 ENCOUNTER — OFFICE VISIT (OUTPATIENT)
Dept: INTERNAL MEDICINE CLINIC | Facility: CLINIC | Age: 65
End: 2020-01-29
Payer: COMMERCIAL

## 2020-01-29 VITALS
RESPIRATION RATE: 16 BRPM | HEIGHT: 66 IN | DIASTOLIC BLOOD PRESSURE: 76 MMHG | WEIGHT: 186 LBS | SYSTOLIC BLOOD PRESSURE: 120 MMHG | HEART RATE: 88 BPM | BODY MASS INDEX: 29.89 KG/M2 | TEMPERATURE: 99 F | OXYGEN SATURATION: 98 %

## 2020-01-29 DIAGNOSIS — J06.9 VIRAL URI: Primary | ICD-10-CM

## 2020-01-29 DIAGNOSIS — L50.0 URTICARIA DUE TO DRUG ALLERGY: ICD-10-CM

## 2020-01-29 DIAGNOSIS — T50.905A URTICARIA DUE TO DRUG ALLERGY: ICD-10-CM

## 2020-01-29 PROCEDURE — 99213 OFFICE O/P EST LOW 20 MIN: CPT | Performed by: FAMILY MEDICINE

## 2020-01-29 RX ORDER — HYDROXYZINE HYDROCHLORIDE 25 MG/1
TABLET, FILM COATED ORAL
Qty: 30 TABLET | Refills: 1 | Status: SHIPPED | OUTPATIENT
Start: 2020-01-29 | End: 2020-03-03 | Stop reason: ALTCHOICE

## 2020-01-29 RX ORDER — PREDNISONE 20 MG/1
TABLET ORAL
Qty: 18 TABLET | Refills: 0 | Status: SHIPPED | OUTPATIENT
Start: 2020-01-29 | End: 2020-03-03 | Stop reason: ALTCHOICE

## 2020-01-29 NOTE — PROGRESS NOTES
CHIEF COMPLAINT:   Patient presents with:  Cold: ROOM 4 over the last 4 days, hives over body   Rash: hives unresolved by benadryl       HPI:   Flaquita Escamilla is a 59year old female who presents for upper respiratory symptoms for  6 days.  Patient re pain  NEURO: Denies headaches    EXAM:   /76   Pulse 88   Temp 98.8 °F (37.1 °C)   Resp 16   Ht 66\"   Wt 186 lb (84.4 kg)   SpO2 98%   Breastfeeding No   BMI 30.02 kg/m²   GENERAL: well developed, well nourished,in no apparent distress  SKIN: entire improve or concerns increase. The patient indicates understanding of these issues and agrees to the plan.

## 2020-03-03 ENCOUNTER — OFFICE VISIT (OUTPATIENT)
Dept: INTERNAL MEDICINE CLINIC | Facility: CLINIC | Age: 65
End: 2020-03-03
Payer: COMMERCIAL

## 2020-03-03 VITALS
SYSTOLIC BLOOD PRESSURE: 128 MMHG | DIASTOLIC BLOOD PRESSURE: 84 MMHG | WEIGHT: 186 LBS | TEMPERATURE: 99 F | HEIGHT: 66 IN | BODY MASS INDEX: 29.89 KG/M2 | OXYGEN SATURATION: 97 % | HEART RATE: 88 BPM | RESPIRATION RATE: 16 BRPM

## 2020-03-03 DIAGNOSIS — I10 HTN, GOAL BELOW 140/90: ICD-10-CM

## 2020-03-03 DIAGNOSIS — H25.813 COMBINED FORMS OF AGE-RELATED CATARACT OF BOTH EYES: ICD-10-CM

## 2020-03-03 DIAGNOSIS — Z01.818 PREOP EXAMINATION: Primary | ICD-10-CM

## 2020-03-03 PROCEDURE — 99214 OFFICE O/P EST MOD 30 MIN: CPT | Performed by: PHYSICIAN ASSISTANT

## 2020-03-03 RX ORDER — OFLOXACIN 3 MG/ML
SOLUTION/ DROPS OPHTHALMIC
COMMUNITY
Start: 2020-02-17

## 2020-03-03 RX ORDER — PREDNISOLONE ACETATE 10 MG/ML
SUSPENSION/ DROPS OPHTHALMIC
COMMUNITY
Start: 2020-02-17

## 2020-03-03 RX ORDER — BROMFENAC SODIUM 0.7 MG/ML
SOLUTION/ DROPS OPHTHALMIC
COMMUNITY
Start: 2020-02-20

## 2020-03-03 NOTE — PATIENT INSTRUCTIONS
Follow pre-operative instructions from your surgeon     Follow up for flu shot when desired. Consider TDAP (tetanus shot) and Shingrix (shingles vaccine- available at the pharmacy) before going on medicare.  For Shingrix, you will need 2 vaccines total, 2

## 2020-03-03 NOTE — H&P
Maribeth Stewart is a 59year old year old female who presents for a pre-operative physical exam.  Patient is to have cataract extraction w/ intraocular lens implant, to be done by Dr. Kit Rhodes at [facility] on 3/4/3030 (left) and 4/1/20 history includes Heart Disorder in her father; Hypertension in her father.   Social Hx: Social History    Tobacco Use      Smoking status: Never Smoker      Smokeless tobacco: Never Used    Alcohol use: Yes      Comment: rare; 1 glass monthly    Drug use: N intraocular lens implant, to be done by Dr. Destiny Rivera at [facility] on 3/4/3030 (left) and 4/1/2020 (right).     Pt has the following conditions:   Patient Active Problem List:     HTN, goal below 140/90     Hyperlipidemia with target LDL less

## 2020-03-12 DIAGNOSIS — E55.9 VITAMIN D DEFICIENCY: ICD-10-CM

## 2020-03-13 RX ORDER — ERGOCALCIFEROL 1.25 MG/1
CAPSULE ORAL
Qty: 12 CAPSULE | Refills: 0 | OUTPATIENT
Start: 2020-03-13

## 2020-05-14 ENCOUNTER — TELEPHONE (OUTPATIENT)
Dept: INTERNAL MEDICINE CLINIC | Facility: CLINIC | Age: 65
End: 2020-05-14

## 2020-05-14 DIAGNOSIS — Z20.822 EXPOSURE TO COVID-19 VIRUS: Primary | ICD-10-CM

## 2020-05-14 DIAGNOSIS — I10 HTN, GOAL BELOW 140/90: ICD-10-CM

## 2020-05-14 NOTE — TELEPHONE ENCOUNTER
Order Req:   Lab Order to be completed at THE [facility] lab    Also Pt making req to have COVID 19 antibody test. She believes she had it in late Nov/Dec 2019.

## 2020-05-14 NOTE — TELEPHONE ENCOUNTER
Orders placed. Previous orders in system for Comp and lipid to be completed. Previous COVID-19 antibody test was sent to [facility] and needs to be sent to [id number]. Previous order canceled and new order placed.

## 2020-05-26 ENCOUNTER — APPOINTMENT (OUTPATIENT)
Dept: LAB | Age: 65
End: 2020-05-26
Attending: PHYSICIAN ASSISTANT
Payer: COMMERCIAL

## 2020-05-26 DIAGNOSIS — E55.9 VITAMIN D DEFICIENCY: ICD-10-CM

## 2020-05-26 DIAGNOSIS — I10 HTN, GOAL BELOW 140/90: ICD-10-CM

## 2020-05-26 DIAGNOSIS — E78.5 HYPERLIPIDEMIA WITH TARGET LDL LESS THAN 130: ICD-10-CM

## 2020-05-26 PROCEDURE — 80061 LIPID PANEL: CPT | Performed by: PHYSICIAN ASSISTANT

## 2020-05-26 PROCEDURE — 83036 HEMOGLOBIN GLYCOSYLATED A1C: CPT | Performed by: PHYSICIAN ASSISTANT

## 2020-05-26 PROCEDURE — 81001 URINALYSIS AUTO W/SCOPE: CPT | Performed by: INTERNAL MEDICINE

## 2020-05-26 PROCEDURE — 82306 VITAMIN D 25 HYDROXY: CPT | Performed by: PHYSICIAN ASSISTANT

## 2020-05-26 PROCEDURE — 80053 COMPREHEN METABOLIC PANEL: CPT | Performed by: PHYSICIAN ASSISTANT

## 2020-05-26 PROCEDURE — 36415 COLL VENOUS BLD VENIPUNCTURE: CPT | Performed by: PHYSICIAN ASSISTANT

## 2020-05-28 ENCOUNTER — OFFICE VISIT (OUTPATIENT)
Dept: INTERNAL MEDICINE CLINIC | Facility: CLINIC | Age: 65
End: 2020-05-28
Payer: COMMERCIAL

## 2020-05-28 ENCOUNTER — TELEPHONE (OUTPATIENT)
Dept: INTERNAL MEDICINE CLINIC | Facility: CLINIC | Age: 65
End: 2020-05-28

## 2020-05-28 VITALS
DIASTOLIC BLOOD PRESSURE: 82 MMHG | TEMPERATURE: 99 F | RESPIRATION RATE: 16 BRPM | WEIGHT: 179 LBS | SYSTOLIC BLOOD PRESSURE: 136 MMHG | BODY MASS INDEX: 28.77 KG/M2 | HEIGHT: 66 IN

## 2020-05-28 DIAGNOSIS — E78.5 HYPERLIPIDEMIA WITH TARGET LDL LESS THAN 130: ICD-10-CM

## 2020-05-28 DIAGNOSIS — I10 HTN, GOAL BELOW 140/90: ICD-10-CM

## 2020-05-28 PROCEDURE — 99213 OFFICE O/P EST LOW 20 MIN: CPT | Performed by: INTERNAL MEDICINE

## 2020-05-28 RX ORDER — ROSUVASTATIN CALCIUM 10 MG/1
TABLET, COATED ORAL
Qty: 90 TABLET | Refills: 3 | Status: SHIPPED | OUTPATIENT
Start: 2020-05-28 | End: 2021-07-07

## 2020-05-28 RX ORDER — QUINAPRIL HCL AND HYDROCHLOROTHIAZIDE 10; 12.5 MG/1; MG/1
1 TABLET ORAL
Qty: 90 TABLET | Refills: 3 | Status: SHIPPED | OUTPATIENT
Start: 2020-05-28

## 2020-05-28 NOTE — TELEPHONE ENCOUNTER
Patient had her last office visit with Dr Elmer Wilson on 05/28/2020. She confirmed [place] another [place], and is no loner a patient of Dr Elmer Wilson.

## 2020-05-28 NOTE — PROGRESS NOTES
HPI:    Patient ID: Birgit Christiansen is a 59year old female. HTN       Birgit Christiansen is a 59year old female.      HPI:   Patient presents for recheck of her hypertension and hyperlipidemia/ Pt has been taking medications as instructed, no me Suspension SHAKE LQ AND INT 1 GTT IN OS QID     • Tobramycin Sulfate 0.3 % Ophthalmic Solution Place 2 drops into the left eye every 4 (four) hours.  For 5 Days (Patient not taking: Reported on 5/28/2020 ) 5 mL 0   • ergocalciferol 1.25 MG (87009 UT) Oral C The patient indicates understanding of these issues and agrees to the plan. The patient is asked to return in prn.       Review of Systems         Current Outpatient Medications   Medication Sig Dispense Refill   • Rosuvastatin Calcium 10 MG Oral Tab TAKE

## 2021-01-18 ENCOUNTER — TELEPHONE (OUTPATIENT)
Dept: INTERNAL MEDICINE CLINIC | Facility: CLINIC | Age: 66
End: 2021-01-18

## 2021-01-18 NOTE — TELEPHONE ENCOUNTER
Patient called and stated, she has a root canal today and the dentist wants to put her on antibiotics due to having an infection in her tooth but due to having C diff in the past (2004 )she wants to avoid them. Inquiring about Dr. Taylor ellis.

## 2021-07-06 DIAGNOSIS — E78.5 HYPERLIPIDEMIA WITH TARGET LDL LESS THAN 130: ICD-10-CM

## 2021-07-07 RX ORDER — ROSUVASTATIN CALCIUM 10 MG/1
TABLET, COATED ORAL
Qty: 90 TABLET | Refills: 0 | Status: SHIPPED | OUTPATIENT
Start: 2021-07-07

## 2022-06-09 NOTE — TELEPHONE ENCOUNTER
Pt says she has dark bruising and tenderness of her pinky toe - she thinks it is sprained or broken. Does she need to come in for an appt or can something be recommended over the phone?
Spoke with patient and she denies any injury/trauma. States she noticed pain and bruising since Thursday, bruising is starting to get better as well as the swelling. Requesting recommendations, she does not want to come out for an xray at this time.      Pa
00:25

## (undated) NOTE — MR AVS SNAPSHOT
[address]  [address] [facility], [facility] [address] [place] [id number]  [phone]               Thank you for choosing us for your health care visit with Nohemy Robledo PA-C.   We are glad to serve you and happy to provide you with taking this medication, and follow the directions you see here.    Commonly known as:  CRESTOR                Where to Get Your Medications      These medications were sent to [place] [address] [address] [facility], [address] [facility] AT [address] [facility] 2 ½ hours per week – spread out over time Use a lani to keep you motivated   Don’t forget strength training with weights and resistance Set goals and track your progress   You don’t need to join a gym. Home exercises work great.  Put more priority on exe